# Patient Record
Sex: FEMALE | Race: OTHER | Employment: UNEMPLOYED | URBAN - METROPOLITAN AREA
[De-identification: names, ages, dates, MRNs, and addresses within clinical notes are randomized per-mention and may not be internally consistent; named-entity substitution may affect disease eponyms.]

---

## 2019-04-23 ENCOUNTER — HOSPITAL ENCOUNTER (OUTPATIENT)
Dept: PHYSICAL THERAPY | Age: 34
Discharge: HOME | End: 2019-04-23
Payer: SELF-PAY

## 2019-04-23 PROCEDURE — 97110 THERAPEUTIC EXERCISES: CPT

## 2019-04-23 PROCEDURE — 97162 PT EVAL MOD COMPLEX 30 MIN: CPT

## 2019-04-23 NOTE — PROGRESS NOTES
[facility] 
[address] [facility]  [facility], [address] [facility] Ilana Ph [phone]  Fx [phone] Plan of Care/ Statement of Necessity for Physical Therapy Services Patient name: Aida Cardoza Start of Care: 2019 Referral source: Thompson Dewitt MD : 1985 Medical Diagnosis: right knee pain Onset Date:2018 Treatment Diagnosis: Pain in right knee [M25.561] Prior Hospitalization: see medical history Provider#: [id number] Medications: Verified on Patient summary List  
 Comorbidities: diabetes Prior Level of Function: functionally independent, Danish speaking The Plan of Care and following information is based on the information from the initial evaluation. Assessment/ key information: 37 yo female who presents to [facility] with c/o right knee pain. Patient reports her pain started last December with no specific BENNIE. Patient presents with right knee ROM WFL, however she has decreased strength, impaired gait and balance on right LE. Patient reports she uses Meloxicam and ice to help decrease the pain which stays between 5/10 and 10/10. Patient reports her right knee pain is better with rest and worst with ambulation. She is currently not working because of knee pain. Patient would benefit from skilled PT intervention to address above deficits. Patient will continue to benefit from skilled PT services to modify and progress therapeutic interventions, address functional mobility deficits, address strength deficits, analyze and address soft tissue restrictions, analyze and cue movement patterns, analyze and modify body mechanics/ergonomics and assess and modify postural abnormalities to attain remaining goals.  
 
 
Evaluation Complexity History MEDIUM  Complexity : 1-2 comorbidities / personal factors will impact the outcome/ POC ; Examination MEDIUM Complexity : 3 Standardized tests and measures addressing body structure, function, activity limitation and / or participation in recreation  ;Presentation MEDIUM Complexity : Evolving with changing characteristics  ; Clinical Decision Making MEDIUM Complexity : FOTO score of 26-74 Overall Complexity Rating: MEDIUM Problem List: pain affecting function, decrease strength, edema affecting function, impaired gait/ balance, decrease ADL/ functional abilitiies and decrease activity tolerance Treatment Plan may include any combination of the following: Therapeutic exercise, Therapeutic activities, Neuromuscular re-education, Physical agent/modality, Gait/balance training, Manual therapy, Patient education, Functional mobility training and Stair training Patient / Family readiness to learn indicated by: asking questions, trying to perform skills and interest 
Persons(s) to be included in education: patient (P) Barriers to Learning/Limitations: none Measures taken if barriers to learning: NA 
Patient Goal (s): to make my knee feel better Patient Self Reported Health Status: fair Rehabilitation Potential: good Short Term Goals: To be accomplished in 3 weeks: 1. Patient will be compliant with HEP to progress toward goals and restore functional mobility. Eval Status: Issued at [email or web address] 
2. Patient will improve FOTO score by 13 points to improve functional tolerance for exercise. Eval Status: FOTO 32 
  
3. Patient will improve pain in right knee to 5/10 at worst to improve ambulation tolerance and restore prior level of function. Eval Status: 10/10 at worst 
  
Long Term Goals: To be accomplished in 6 weeks: 1. Patient will be independent with HEP to progress toward goals of community ambulation without pain. Eval Status: issued at [address] 
2. Patient will improve FOTO score by 26 points to improve functional tolerance for community ambulation. Eval Status: FOTO 32 
  
3. Patient will have 5/5 right LE strength to return to goals of returning to work. Eval Status: Hip L (1-5) R (1-5) Hip Flexion 5/5 4/5* Hip ABD 5/5 4/5 Hip ADD 5/5 4-/5 Hip ER 4+/5 3+/5 Hip IR 4+/5 3+/5  
  
Knee L (1-5) R (1-5) Knee Flexion 5/5 4/5* Knee Extension 5/5 4/5* Ankle PF 5/5 5/5 Ankle DF 5/5 5/5 Other      
  
  
4. Patient will improve pain in right knee to 2/10 to improve ambulation tolerance and restore prior level of function. Eval Status: 10/10 at worst 
 
 
 
Frequency / Duration: Patient to be seen 2 times per week for 6 weeks. Patient/ Caregiver education and instruction: Diagnosis, prognosis, activity modification and exercises 
 [x]  Plan of care has been reviewed with PTA Certification Period: April 23, 2019-July 22, 2019 Andie Fernando, PT 4/23/2019 3:28 PM 
 
________________________________________________________________________ I certify that the above Therapy Services are being furnished while the patient is under my care. I agree with the treatment plan and certify that this therapy is necessary. [de-identified] Signature:_____________________Date:____________TIME:________ 
 
[employer], Date and Time must be completed for valid certification ** Please sign and return to [facility] 
[address], [address] [facility] Ph [phone]  Fx [phone]

## 2019-04-23 NOTE — PROGRESS NOTES
PT DAILY TREATMENT NOTE/Hip/Knee/Ankle EVAL 10-18 Patient Name: [facility] Date:2019 : 1985 [x]  Patient  Verified Payor: SELF PAY / Plan: [facility] SELF PAY / Product Type: Self Pay / In time:1350  Out time:1445 Total Treatment Time (min): 55 Visit #: 1 of 8 Medicare/BCBS Only Total Timed Codes (min):  NA 1:1 Treatment Time:  NA Treatment Area: Pain in right knee [M25.561] SUBJECTIVE Pain Level (0-10 scale): 9/10 deep pain 
[x]constant []intermittent []improving []worsening []no change since onset Any medication changes, allergies to medications, adverse drug reactions, diagnosis change, or new procedure performed?: [x] No    [] Yes (see summary sheet for update) Subjective functional status/changes:    
PLOF: functionally independent without AD, Honduran speaking Limitations to PLOF: pain in right knee, decreased ambulation tolerance Mechanism of Injury: right knee pain that started in , no specific BENNIE Current symptoms/Complaints: night pain, worse with walking, 9/10 today, 10/10 at worst, 5/10 best 
Previous Treatment/Compliance: meloxicam, ice, no previous therapy PMHx/Surgical Hx: N/A Work Hx: out of work currently due to knee pain, normally cleans for work Living Situation: one level home, with significant other and baby Pt Goals: \"to make my knee feel better. \" 
Barriers: [x]pain []financial []time []transportation []other Motivation: good Substance use: []Alcohol []Tobacco []other:  
Cognition: A & O x 3    Other: OBJECTIVE Modalities Rationale:     decrease inflammation and decrease pain to improve patient's ability to ambulate community distances 
 min [] Estim, type/location:    
                                 []  att     []  unatt     []  w/US     []  w/ice    []  w/heat  
 min []  Mechanical Traction: type/lbs   
               []  pro   []  sup   []  int   []  cont    []  before manual    []  after manual  
 min []  Ultrasound, settings/location:    
 min []  Iontophoresis w/ dexamethasone, location:   
                                           []  take home patch       []  in clinic  
10 min [x]  Ice     []  Heat    location/position: Supine, right knee  
 min []  Vasopneumatic Device, press/temp:   
 min []  Other:   
[x] Skin assessment post-treatment (if applicable):   
[x]  intact    [x]  redness- no adverse reaction    
[]redness  adverse reaction:     
 
15 min [x]Eval                  []Re-Eval  
 
 
25 min Therapeutic Exercise:  [x] See flow sheet :  
Rationale: increase strength, improve balance and increase proprioception to improve the patients ability to ambulate community distances and restore PLOF. With 
 [x] TE 
 [] TA 
 [] neuro 
 [] other: Patient Education: [x] Review HEP [] Progressed/Changed HEP based on:  
[] positioning   [] body mechanics   [] transfers   [] heat/ice application   
[] other:   
 
 
General Evaluation Gait: antalgic gait on right LE, keeps right knee locked in extension Palpation/Sensation: pain with palpation over medial joint line and inferior/lateral to patella ROM:                              
 
       AROM Knee Left Right Ext WFL 0 Flex  Strength (MMT): 
                                         
Hip L (1-5) R (1-5) Hip Flexion 5/5 4/5* Hip ABD 5/5 4/5 Hip ADD 5/5 4-/5 Hip ER 4+/5 3+/5 Hip IR 4+/5 3+/5 Knee L (1-5) R (1-5) Knee Flexion 5/5 4/5* Knee Extension 5/5 4/5* Ankle PF 5/5 5/5 Ankle DF 5/5 5/5 Other Special Tests: 
 
Knee  Left Right Varus Stress Test  pain Valgus Stress Test  pain Johnny's  pain Patellar Grind  Slight pain Hip Left Right Darrel Test  + Obers  + Pain Level (0-10 scale) post treatment: 7/10 ASSESSMENT/Changes in Function: 35 yo female who presents to [facility] with c/o right knee pain.  Patient reports her pain started last December with no specific BENNIE. Patient presents with right knee ROM WFL, however she has decreased strength, impaired gait and balance on right LE. Patient reports she uses Meloxicam and ice to help decrease the pain which stays between 5/10 and 10/10. Patient reports her right knee pain is better with rest and worst with ambulation. She is currently not working because of knee pain. Patient would benefit from skilled PT intervention to address above deficits. Patient will continue to benefit from skilled PT services to modify and progress therapeutic interventions, address functional mobility deficits, address strength deficits, analyze and address soft tissue restrictions, analyze and cue movement patterns, analyze and modify body mechanics/ergonomics and assess and modify postural abnormalities to attain remaining goals. [x]  See Plan of Care 
[]  See progress note/recertification 
[]  See Discharge Summary Progress towards goals / Updated goals: 
 
Short Term Goals: To be accomplished in 3 weeks: 1. Patient will be compliant with HEP to progress toward goals and restore functional mobility. Eval Status: Issued at [employer] 
 
2. Patient will improve FOTO score by 13 points to improve functional tolerance for exercise. Eval Status: FOTO 32 
 
3. Patient will improve pain in right knee to 5/10 at worst to improve ambulation tolerance and restore prior level of function. Eval Status: 10/10 at worst 
 
Long Term Goals: To be accomplished in 6 weeks: 1. Patient will be independent with HEP to progress toward goals of community ambulation without pain. Eval Status: issued at [employer] 
 
2. Patient will improve FOTO score by 26 points to improve functional tolerance for community ambulation. Eval Status: FOTO 32 
 
3. Patient will have 5/5 right LE strength to return to goals of returning to work. Eval Status:  
Hip L (1-5) R (1-5) Hip Flexion 5/5 4/5* Hip ABD 5/5 4/5 Hip ADD 5/5 4-/5 Hip ER 4+/5 3+/5 Hip IR 4+/5 3+/5 Knee L (1-5) R (1-5) Knee Flexion 5/5 4/5* Knee Extension 5/5 4/5* Ankle PF 5/5 5/5 Ankle DF 5/5 5/5 Other 4. Patient will improve pain in right knee to 2/10 to improve ambulation tolerance and restore prior level of function. Eval Status: 10/10 at worst 
 
 
PLAN [x]  Upgrade activities as tolerated     [x]  Continue plan of care 
[]  Update interventions per flow sheet      
[]  Discharge due to:_ 
[]  Other:_   
 
Amalia Elliott, PT 4/23/2019  1:35 PM

## 2019-04-24 ENCOUNTER — HOSPITAL ENCOUNTER (OUTPATIENT)
Dept: PHYSICAL THERAPY | Age: 34
Discharge: HOME | End: 2019-04-24
Payer: SELF-PAY

## 2019-04-24 PROCEDURE — 97140 MANUAL THERAPY 1/> REGIONS: CPT

## 2019-04-24 PROCEDURE — 97110 THERAPEUTIC EXERCISES: CPT

## 2019-04-24 NOTE — PROGRESS NOTES
PT DAILY TREATMENT NOTE Patient Name: [facility] Date:2019 : 1985 [x]  Patient  Verified Payor: SELF PAY / Plan: [facility] SELF PAY / Product Type: Self Pay / In time:905  Out time:954 Total Treatment Time (min): 49 Total Timed Codes (min): 49 
1:1 Treatment Time (MC/BCBS only): n/a Visit #: 2 of 12 Treatment Area: Pain in right knee [M25.561] SUBJECTIVE Pain Level (0-10 scale): 8 Any medication changes, allergies to medications, adverse drug reactions, diagnosis change, or new procedure performed?: [x] No    [] Yes (see summary sheet for update) Subjective functional status/changes:   [] No changes reported Reports compliance with HEP, most pain with WB during ambulation OBJECTIVEModalities Rationale:     Pain control 
 min [] Estim, type/location:   
                                 []  att     []  unatt     []  w/US     []  w/ice    []  w/heat 
 min []  Mechanical Traction: type/lbs   
               []  pro   []  sup   []  int   []  cont    []  before manual    []  after manual  
 min []  Ultrasound, settings/location:    
 min []  Iontophoresis w/ dexamethasone, location:   
                                           []  take home patch       []  in clinic  
 min []  Ice     []  Heat    location/position:   
 min []  Vasopneumatic Device, press/temp:   
 min []  Other:   
[] Skin assessment post-treatment (if applicable):   
[]  intact    []  redness- no adverse reaction    
[]redness  adverse reaction:     
 
 
 
39 min Therapeutic Exercise:  [x] See flow sheet :  
Rationale: increase strength and increase proprioception to improve the patients ability to return to PLOF 10 min Manual Therapy:  CFM to ember/lat joint line right knee, functional massage and application of Kinesiotape for increased stability Rationale: decrease pain With 
 [] TE 
 [] TA 
 [] neuro 
 [] other: Patient Education: [x] Review HEP   
 [] Progressed/Changed HEP based on:  
[] positioning   [] body mechanics   [] transfers   [] heat/ice application   
[] other:   
 
Other Objective/Functional Measures:  
pain with passive tibial IR 
TTP right ember/lateral joint line/tibial plateau Reports minimal pain with TE but rates pain as 4/10 No antalgic movement signs Symptoms consistent with possible lateral meniscal pathology Pain Level (0-10 scale) post treatment: 4 
 
ASSESSMENT/Changes in Function: some hesitation with TE, using  to improve form with TE 
 
Patient will continue to benefit from skilled PT services to address strength deficits, analyze and address soft tissue restrictions, analyze and cue movement patterns, analyze and modify body mechanics/ergonomics and assess and modify postural abnormalities  
 to attain remaining goals. [x]  See Plan of Care 
[]  See progress note/recertification 
[]  See Discharge Summary Progress towards goals / Updated goals: 
 
Short Term Goals: To be accomplished in 3 weeks: 1. Patient will be compliant with HEP to progress toward goals and restore functional mobility. Eval Status: Issued at [facility] 
Current status: compliance 
  
2. Patient will improve FOTO score by 13 points to improve functional tolerance for exercise. Eval Status: FOTO 32 
  
3. Patient will improve pain in right knee to 5/10 at worst to improve ambulation tolerance and restore prior level of function. Eval Status: 10/10 at worst 
  
Long Term Goals: To be accomplished in 6 weeks: 1. Patient will be independent with HEP to progress toward goals of community ambulation without pain. Eval Status: issued at [facility] 
  
2. Patient will improve FOTO score by 26 points to improve functional tolerance for community ambulation. Eval Status: FOTO 32 
  
3. Patient will have 5/5 right LE strength to return to goals of returning to work. Eval Status:  
Hip L (1-5) R (1-5) Hip Flexion 5/5 4/5* Hip ABD 5/5 4/5  
 Hip ADD 5/5 4-/5 Hip ER 4+/5 3+/5 Hip IR 4+/5 3+/5  
  
Knee L (1-5) R (1-5) Knee Flexion 5/5 4/5* Knee Extension 5/5 4/5* Ankle PF 5/5 5/5 Ankle DF 5/5 5/5 Other      
  
  
4. Patient will improve pain in right knee to 2/10 to improve ambulation tolerance and restore prior level of function. Eval Status: 10/10 at worst 
  
 
 
 
 
PLAN 
[]  Upgrade activities as tolerated     [x]  Continue plan of care 
[]  Update interventions per flow sheet      
[]  Discharge due to:_ 
[]  Other:_ Felicitas Tidwell PT 4/24/2019  9:13 AM 
 
Future Appointments Date Time Provider Mary Meredith 5/6/2019  2:00 PM Noemi Cost, [facility]  
5/8/2019  8:30 AM Jasvir Cuello [facility]  
5/13/2019  1:30 PM Noemi Cost, [facility]  
5/15/2019  1:00 PM Noemi Cost, [facility]  
5/20/2019  1:00 PM Noemi Cost, [facility]  
5/22/2019  9:30 AM Jasvir Cuello [facility]  
5/28/2019  1:15 PM Noemi Cost, [facility]  
5/29/2019 10:30 AM Noemi Cost, [facility]

## 2019-05-06 ENCOUNTER — HOSPITAL ENCOUNTER (OUTPATIENT)
Dept: PHYSICAL THERAPY | Age: 34
Discharge: HOME | End: 2019-05-06
Payer: SELF-PAY

## 2019-05-06 PROCEDURE — 97140 MANUAL THERAPY 1/> REGIONS: CPT

## 2019-05-06 PROCEDURE — 97110 THERAPEUTIC EXERCISES: CPT

## 2019-05-06 NOTE — PROGRESS NOTES
PT DAILY TREATMENT NOTE Patient Name: Burton Simms Date:2019 : 1985 [x]  Patient  Verified Payor: SELF PAY / Plan: [employer] SELF PAY / Product Type: Self Pay / In time:200  Out time:253 Total Treatment Time (min): 53 Total Timed Codes (min): 43 
1:1 Treatment Time (MC only): 43 Visit #: 3 of 12 Treatment Area: Pain in right knee [M25.561] SUBJECTIVE Pain Level (0-10 scale): 6-7/10 Any medication changes, allergies to medications, adverse drug reactions, diagnosis change, or new procedure performed?: [x] No    [] Yes (see summary sheet for update) Subjective functional status/changes:   [] No changes reported Pt reports that she has been compliant with her HEP OBJECTIVE Modalities Rationale:     decrease pain and increase tissue extensibility to improve patient's ability to ambulate community distances 
 min [] Estim, type/location:   
                                 []  att     []  unatt     []  w/US     []  w/ice    []  w/heat 
 min []  Mechanical Traction: type/lbs   
               []  pro   []  sup   []  int   []  cont    []  before manual    []  after manual  
 min []  Ultrasound, settings/location:    
 min []  Iontophoresis w/ dexamethasone, location:   
                                           []  take home patch       []  in clinic  
10 min []  Ice     [x]  Heat    location/position: Hook[place], right knee wrapped around  
 min []  Vasopneumatic Device, press/temp:   
 min []  Other:   
[x] Skin assessment post-treatment (if applicable):   
[x]  intact    [x]  redness- no adverse reaction    
[]redness  adverse reaction:     
 
33 min Therapeutic Exercise:  [] See flow sheet :  
Rationale: increase ROM, increase strength, improve coordination, improve balance and increase proprioception to improve the patients ability to improve community ambulation 15 min Manual Therapy:  DTM to posterior medial popliteal region on right leg, manual hamstring stretch,   
Rationale: decrease pain, increase ROM, increase tissue extensibility, decrease edema  and decrease trigger points to improve the patients ability to improve community ambulation With 
 [] TE 
 [] TA 
 [] neuro 
 [] other: Patient Education: [x] Review HEP [] Progressed/Changed HEP based on:  
[x] positioning   [x] body mechanics   [] transfers   [] heat/ice application   
[] other:   
 
Other Objective/Functional Measures:  
 
Pain Level (0-10 scale) post treatment: 0/10 ASSESSMENT/Changes in Function: Pt demonstrates limited mobility in medial posterior compartment of right knee contributing to bakers cyst and anterior lateral pain. Added 3 way hamstring stretch to promote improved soft tissue mobility. Patient will continue to benefit from skilled PT services to address functional mobility deficits, address ROM deficits, address strength deficits, analyze and address soft tissue restrictions, analyze and cue movement patterns, analyze and modify body mechanics/ergonomics and assess and modify postural abnormalities to attain remaining goals. []  See Plan of Care 
[]  See progress note/recertification 
[]  See Discharge Summary Progress towards goals / Updated goals: 
Short Term Goals: To be accomplished in 3 weeks: 1. Patient will be compliant with HEP to progress toward goals and restore functional mobility. Eval Status: Issued at Eval 
Current status: compliance 
  
2. Patient will improve FOTO score by 13 points to improve functional tolerance for exercise. Eval Status: FOTO 32 
  
3. Patient will improve pain in right knee to 5/10 at worst to improve ambulation tolerance and restore prior level of function. Eval Status: 10/10 at worst 
Current: 0/10 at end of treatment session 5-6-19 
  
Long Term Goals: To be accomplished in 6 weeks: 1. Patient will be independent with HEP to progress toward goals of community ambulation without pain. Eval Status: issued at Eval 
  
2. Patient will improve FOTO score by 26 points to improve functional tolerance for community ambulation. Eval Status: FOTO 32 
  
3. Patient will have 5/5 right LE strength to return to goals of returning to work. Eval Status:  
Hip L (1-5) R (1-5) Hip Flexion 5/5 4/5* Hip ABD 5/5 4/5 Hip ADD 5/5 4-/5 Hip ER 4+/5 3+/5 Hip IR 4+/5 3+/5  
  
Knee L (1-5) R (1-5) Knee Flexion 5/5 4/5* Knee Extension 5/5 4/5* Ankle PF 5/5 5/5 Ankle DF 5/5 5/5 Other      
  
  
4. Patient will improve pain in right knee to 2/10 to improve ambulation tolerance and restore prior level of function. Eval Status: 10/10 at worst 
  
 
 
 
 
PLAN 
[]  Upgrade activities as tolerated     [x]  Continue plan of care 
[]  Update interventions per flow sheet      
[]  Discharge due to:_ 
[]  Other:_ Estefanía Quiles PTA 5/6/2019  2:31 PM 
 
Future Appointments Date Time Provider Mary Meredith 5/8/2019  8:30 AM Rosa Brown PTA [address] [facility]  
5/13/2019  1:30 PM GLENN Greene [address] [facility]  
5/15/2019  1:00 PM GLENN [place] [address] [facility]  
5/20/2019  1:00 PM Georgina Castro [address] [facility]  
5/22/2019  9:30 AM Georgina Robles [address] [facility]  
5/28/2019  1:15 PM GLENN [place] [address] [facility]  
5/29/2019 10:30 AM GLENN Greene [address] [facility]

## 2019-05-08 ENCOUNTER — HOSPITAL ENCOUNTER (OUTPATIENT)
Dept: PHYSICAL THERAPY | Age: 34
Discharge: HOME | End: 2019-05-08
Payer: SELF-PAY

## 2019-05-08 PROCEDURE — 97140 MANUAL THERAPY 1/> REGIONS: CPT

## 2019-05-08 PROCEDURE — 97110 THERAPEUTIC EXERCISES: CPT

## 2019-05-08 NOTE — PROGRESS NOTES
PT DAILY TREATMENT NOTE Patient Name: [facility] Date:2019 : 1985 [x]  Patient  Verified Payor: SELF PAY / Plan: [facility] SELF PAY / Product Type: Self Pay / In time:835  Out time:931 Total Treatment Time (min): 56 Total Timed Codes (min): 46 
1:1 Treatment Time ( [address] only): 46 Visit #: 4 of 12 Treatment Area: Pain in right knee [M25.561] SUBJECTIVE Pain Level (0-10 scale): 610 Any medication changes, allergies to medications, adverse drug reactions, diagnosis change, or new procedure performed?: [x] No    [] Yes (see summary sheet for update) Subjective functional status/changes:   [] No changes reported Pt and  report that she saw her doctor yesterday and he says she should continue with treatment to help improve her knee and that if after this next month she is still having issues he will do an injection if needed. OBJECTIVE Modalities Rationale:     decrease pain and increase tissue extensibility to improve patient's ability to stand more than 30 minutes 
 min [] Estim, type/location:   
                                 []  att     []  unatt     []  w/US     []  w/ice    []  w/heat 
 min []  Mechanical Traction: type/lbs   
               []  pro   []  sup   []  int   []  cont    []  before manual    []  after manual  
 min []  Ultrasound, settings/location:    
 min []  Iontophoresis w/ dexamethasone, location:   
                                           []  take home patch       []  in clinic  
10 min []  Ice     [x]  Heat    location/position: Butterfly stretch supported in supine, right adductor  
 min []  Vasopneumatic Device, press/temp:   
 min []  Other:   
[x] Skin assessment post-treatment (if applicable):   
[x]  intact    [x]  redness- no adverse reaction    
[]redness  adverse reaction:     
 
31 min Therapeutic Exercise:  [] See flow sheet :  
Rationale: increase ROM, increase strength, improve coordination, improve balance and increase proprioception to improve the patients ability to stand more than 30 minutes 15 min Manual Therapy:  DTM to right adductor, skin rolling to right adductor, contract relax to right hamstrings, manual stretching of right adductor, web taping at distal end of medial hamstrings on right Rationale: decrease pain, increase ROM, increase tissue extensibility, decrease edema  and decrease trigger points to improve the patients ability to stand more than 30 minutes With 
 [x] TE 
 [] TA 
 [] neuro 
 [] other: Patient Education: [x] Review HEP [] Progressed/Changed HEP based on:  
[x] positioning   [x] body mechanics   [] transfers   [] heat/ice application   
[] other:   
 
Other Objective/Functional Measures Pain Level (0-10 scale) post treatment: 3/10 ASSESSMENT/Changes in Function: Pt demonstrates very restricted medial hamstring on right contributing to ongoing pain in right knee with ambulation. Added more strength components to promote reduced load into knee. Patient will continue to benefit from skilled PT services to address functional mobility deficits, address ROM deficits, address strength deficits, analyze and address soft tissue restrictions, analyze and cue movement patterns, analyze and modify body mechanics/ergonomics and assess and modify postural abnormalities to attain remaining goals. []  See Plan of Care 
[]  See progress note/recertification 
[]  See Discharge Summary Short Term Goals: To be accomplished in 3 weeks: 1. Patient will be compliant with HEP to progress toward goals and restore functional mobility. Eval Status: Issued at Eval 
Current status: compliance 
  
2. Patient will improve FOTO score by 13 points to improve functional tolerance for exercise. Eval Status: FOTO 32 
  
3. Patient will improve pain in right knee to 5/10 at worst to improve ambulation tolerance and restore prior level of function. Eval Status: 10/10 at worst 
Current: 0/10 at end of treatment session 5-6-19 
  
Long Term Goals: To be accomplished in 6 weeks: 1. Patient will be independent with HEP to progress toward goals of community ambulation without pain. Eval Status: issued at [facility] 
  
2. Patient will improve FOTO score by 26 points to improve functional tolerance for community ambulation. Eval Status: FOTO 32 
  
3. Patient will have 5/5 right LE strength to return to goals of returning to work. Eval Status:  
Hip L (1-5) R (1-5) Hip Flexion 5/5 4/5* Hip ABD 5/5 4/5 Hip ADD 5/5 4-/5 Hip ER 4+/5 3+/5 Hip IR 4+/5 3+/5  
  
Knee L (1-5) R (1-5) Knee Flexion 5/5 4/5* Knee Extension 5/5 4/5* Ankle PF 5/5 5/5 Ankle DF 5/5 5/5 Other      
  
  
4. Patient will improve pain in right knee to 2/10 to improve ambulation tolerance and restore prior level of function. Eval Status: 10/10 at worst 
Current: 6/10 at beginning 
  
  
  
 
 
 
 
PLAN 
[]  Upgrade activities as tolerated     [x]  Continue plan of care 
[]  Update interventions per flow sheet      
[]  Discharge due to:_ 
[]  Other:_ Elver Molina PTA 5/8/2019  8:30 AM 
 
Future Appointments Date Time Provider Mary Meredith 5/13/2019  1:30 PM Enedina Alexander PTA [facility]  
5/15/2019  1:00 PM Enedina Alexander PTA [facility]  
5/20/2019  1:00 PM [facility]  
5/22/2019  9:30 AM [facility]  
5/28/2019  1:15 PM Enedina Alexander PTA [facility]  
5/29/2019 10:30 AM Enedina Alexander PTA [facility]

## 2019-05-13 ENCOUNTER — HOSPITAL ENCOUNTER (OUTPATIENT)
Dept: PHYSICAL THERAPY | Age: 34
Discharge: HOME | End: 2019-05-13
Payer: SELF-PAY

## 2019-05-13 PROCEDURE — 97110 THERAPEUTIC EXERCISES: CPT

## 2019-05-13 PROCEDURE — 97140 MANUAL THERAPY 1/> REGIONS: CPT

## 2019-05-13 NOTE — PROGRESS NOTES
PT DAILY TREATMENT NOTE [facility] : 1985 [x]  Patient  Verified Payor: SELF PAY [facility] / Product Type: Self Pay / In time:206  Out time:247 Total Treatment Time (min): 41 Total Timed Codes (min): 31 
1:1 Treatment Time ([facility] only): 31 Visit #: 5 of 12 Treatment Area: Pain in right knee [M25.561] SUBJECTIVE Pain Level (0-10 scale): 10 Any medication changes, allergies to medications, adverse drug reactions, diagnosis change, or new procedure performed?: [x] No    [] Yes (see summary sheet for update) Subjective functional status/changes:   [] No changes reported Pt reports that she thinks that the tape helped her OBJECTIVE Modalities Rationale:     decrease pain and increase tissue extensibility to improve patient's ability to ambulate community distance 
 min [] Estim, type/location:   
                                 []  att     []  unatt     []  w/US     []  w/ice    []  w/heat 
 min []  Mechanical Traction: type/lbs   
               []  pro   []  sup   []  int   []  cont    []  before manual    []  after manual  
 min []  Ultrasound, settings/location:    
 min []  Iontophoresis w/ dexamethasone, location:   
                                           []  take home patch       []  in clinic  
10 min []  Ice     [x]  Heat    location/position: butterfyl heat on right adductor  
 min []  Vasopneumatic Device, press/temp:   
 min []  Other:   
[x] Skin assessment post-treatment (if applicable):   
[x]  intact    [x]  redness- no adverse reaction    
[]redness  adverse reaction:     
 
21 min Therapeutic Exercise:  [] See flow sheet :  
Rationale: increase ROM, increase strength, improve coordination and increase proprioception to improve the patients ability to ambulate community distance 10 min Manual Therapy:  DTM to medial hamstring on right knee, DTM to right adductors myofascial leg pull, manual adductor stretch, kinesiotape applied with webbing approach over medial knee, Rationale: decrease pain, increase ROM, increase tissue extensibility, decrease edema  and decrease trigger points to improve the patients ability to ambulate community distances With 
 [x] TE 
 [] TA 
 [] neuro 
 [] other: Patient Education: [x] Review HEP [] Progressed/Changed HEP based on:  
[x] positioning   [] body mechanics   [] transfers   [] heat/ice application   
[] other:   
 
Other Objective/Functional Measures:    
 
Pain Level (0-10 scale) post treatment: 0/10 ASSESSMENT/Changes in Function: Pt demonstrates improved softening in adductor and medial knee however continues to have limited mobility through medial hamstring. She will benefit from continued manual work as well as strenghtening Patient will continue to benefit from skilled PT services to address functional mobility deficits, address ROM deficits, address strength deficits, analyze and address soft tissue restrictions, analyze and cue movement patterns, analyze and modify body mechanics/ergonomics and assess and modify postural abnormalities to attain remaining goals. []  See Plan of Care 
[]  See progress note/recertification 
[]  See Discharge Summary Progress towards goals / Updated goals: 
Short Term Goals: To be accomplished in 3 weeks: 1. Patient will be compliant with HEP to progress toward goals and restore functional mobility. Eval Status: Issued at Eval 
Current status: compliance 
  
2. Patient will improve FOTO score by 13 points to improve functional tolerance for exercise. Eval Status: FOTO 32 
  
3. Patient will improve pain in right knee to 5/10 at worst to improve ambulation tolerance and restore prior level of function. Eval Status: 10/10 at worst 
Current: 0/10 at end of treatment session 5-6-19 
  
Long Term Goals: To be accomplished in 6 weeks: 1. Patient will be independent with HEP to progress toward goals of community ambulation without pain. Eval Status: issued at Eval 
  
2. Patient will improve FOTO score by 26 points to improve functional tolerance for community ambulation. Eval Status: FOTO 32 
  
3. Patient will have 5/5 right LE strength to return to goals of returning to work. Eval Status:  
Hip L (1-5) R (1-5) Hip Flexion 5/5 4/5* Hip ABD 5/5 4/5 Hip ADD 5/5 4-/5 Hip ER 4+/5 3+/5 Hip IR 4+/5 3+/5  
  
Knee L (1-5) R (1-5) Knee Flexion 5/5 4/5* Knee Extension 5/5 4/5* Ankle PF 5/5 5/5 Ankle DF 5/5 5/5 Other      
  
  
4. Patient will improve pain in right knee to 2/10 to improve ambulation tolerance and restore prior level of function. Eval Status: 10/10 at worst 
Current: 6/10 at beginning 
  
  
 
 
 
 
PLAN 
[]  Upgrade activities as tolerated     []  Continue plan of care 
[]  Update interventions per flow sheet      
[]  Discharge due to:_ 
[]  Other:_ Saurav Stephens PTA 5/13/2019  2:10 PM 
 
Future Appointments Date Time Provider Mary Meredith 5/15/2019  1:00 PM Loraine Byers PTA [facility]  
5/20/2019  1:00 PM [facility]  
5/22/2019  9:30 AM [facility]  
5/28/2019  1:15 PM Loraine Byers PTA [facility]  
5/29/2019 10:30 AM Loraine Byers PTA [facility]

## 2019-05-15 ENCOUNTER — HOSPITAL ENCOUNTER (OUTPATIENT)
Dept: PHYSICAL THERAPY | Age: 34
Discharge: HOME | End: 2019-05-15
Payer: SELF-PAY

## 2019-05-15 PROCEDURE — 97110 THERAPEUTIC EXERCISES: CPT

## 2019-05-15 PROCEDURE — 97140 MANUAL THERAPY 1/> REGIONS: CPT

## 2019-05-15 NOTE — PROGRESS NOTES
PT DAILY TREATMENT NOTE [facility] Date:5/15/2019 : 1985 [x]  Patient  Verified Payor: SELF PAY / Plan: [facility] SELF PAY / Product Type: Self Pay / In time:1245  Out time:145 Total Treatment Time (min): 60 Total Timed Codes (min): 50 
1:1 Treatment Time ([facility] only): 50 Visit #: 6 of 12 Treatment Area: Pain in right knee [M25.561] SUBJECTIVE Pain Level (0-10 scale): 3/10 Any medication changes, allergies to medications, adverse drug reactions, diagnosis change, or new procedure performed?: [x] No    [] Yes (see summary sheet for update) Subjective functional status/changes:   [] No changes reported Pt reports that she is feeling better. OBJECTIVE Modalities Rationale:     decrease pain and increase tissue extensibility to improve patient's ability to ambulate community distances 
 min [] Estim, type/location:   
                                 []  att     []  unatt     []  w/US     []  w/ice    []  w/heat 
 min []  Mechanical Traction: type/lbs   
               []  pro   []  sup   []  int   []  cont    []  before manual    []  after manual  
 min []  Ultrasound, settings/location:    
 min []  Iontophoresis w/ dexamethasone, location:   
                                           []  take home patch       []  in clinic With treatment min []  Ice     [x]  Heat    location/position: Supine in cobbler pose, right knee  
 min []  Vasopneumatic Device, press/temp:   
 min []  Other:   
[x] Skin assessment post-treatment (if applicable):   
[x]  intact    [x]  redness- no adverse reaction    
[]redness  adverse reaction:     
 
35 min Therapeutic Exercise:  [] See flow sheet :  
Rationale: increase ROM, increase strength, improve coordination, improve balance and increase proprioception to improve the patients ability to ambulate community distances 15 min Manual Therapy:  myofsacial leg pull on right, DTM to right adductors, manual adductor stretch, contract relax on hamstrings on right, web approach for kinesiotape applied to medial right knee Rationale: decrease pain, increase ROM, increase tissue extensibility, decrease edema  and decrease trigger points to improve the patients ability to ambulate community distances With 
 [x] TE 
 [] TA 
 [] neuro 
 [] other: Patient Education: [x] Review HEP [] Progressed/Changed HEP based on:  
[x] positioning   [x] body mechanics   [] transfers   [x] heat/ice application   
[] other:   
 
Other Objective/Functional Measures:    
 
Pain Level (0-10 scale) post treatment: 2/10 ASSESSMENT/Changes in Function: Pt demonstrates improved softening of medial knee and hamstring/adductor contributing to reduced pain. Pt will benefit from addition of strengthening in functional positions at next visit. Patient will continue to benefit from skilled PT services to address functional mobility deficits, address ROM deficits, address strength deficits, analyze and address soft tissue restrictions, analyze and cue movement patterns, analyze and modify body mechanics/ergonomics and assess and modify postural abnormalities to attain remaining goals. []  See Plan of Care 
[]  See progress note/recertification 
[]  See Discharge Summary Progress towards goals / Updated goals: 
Short Term Goals: To be accomplished in 3 weeks: 1. Patient will be compliant with HEP to progress toward goals and restore functional mobility. Eval Status: Issued at Eval 
Current status: compliance 
  
2. Patient will improve FOTO score by 13 points to improve functional tolerance for exercise. Eval Status: FOTO 32 
Current: 38 IMPROVING 
  
3. Patient will improve pain in right knee to 5/10 at worst to improve ambulation tolerance and restore prior level of function. Eval Status: 10/10 at worst 
Current: 2-3/10 MET 
  
Long Term Goals: To be accomplished in 6 weeks: 1. Patient will be independent with HEP to progress toward goals of community ambulation without pain. Eval Status: issued at Eval 
  
2. Patient will improve FOTO score by 26 points to improve functional tolerance for community ambulation. Eval Status: FOTO 32 
Current: 38 IMPROVING 
  
3. Patient will have 5/5 right LE strength to return to goals of returning to work. Eval Status:  
Hip L (1-5) R (1-5) Hip Flexion 5/5 4/5* Hip ABD 5/5 4/5 Hip ADD 5/5 4-/5 Hip ER 4+/5 3+/5 Hip IR 4+/5 3+/5  
  
Knee L (1-5) R (1-5) Knee Flexion 5/5 4/5* Knee Extension 5/5 4/5* Ankle PF 5/5 5/5 Ankle DF 5/5 5/5 Other      
  
  
4. Patient will improve pain in right knee to 2/10 to improve ambulation tolerance and restore prior level of function. Eval Status: 10/10 at worst 
Current: 6/10 at beginning 
  
  
  
  
  
 
 
 
 
PLAN 
[]  Upgrade activities as tolerated     []  Continue plan of care 
[]  Update interventions per flow sheet      
[]  Discharge due to:_ 
[]  Other:_ Fitz Dozier PTA 5/15/2019  11:54 AM 
 
Future Appointments Date Time Provider Mary Meredith 5/15/2019  1:00 PM Harvinder Becerra PTA [facility]  
5/20/2019  1:00 PM [facility]  
5/22/2019  9:30 AM [facility]  
5/28/2019  1:30 PM Jessica Carranza PT [facility]  
5/29/2019 10:30 AM Harvinder Becerra PTA [facility]

## 2019-05-20 ENCOUNTER — HOSPITAL ENCOUNTER (OUTPATIENT)
Dept: PHYSICAL THERAPY | Age: 34
Discharge: HOME | End: 2019-05-20
Payer: SELF-PAY

## 2019-05-20 PROCEDURE — 97110 THERAPEUTIC EXERCISES: CPT

## 2019-05-20 NOTE — PROGRESS NOTES
PT DAILY TREATMENT NOTE Patient Name: [facility] Date:2019 : 1985 [x]  Patient  Verified Payor: SELF PAY / [facility] / Product Type: Self Pay / In time:1250  Out time:1245 Total Treatment Time (min): 55 Total Timed Codes (min): NA 
1:1 Treatment Time ( [address] only): NA Visit #: 7 of 12 Treatment Area: Pain in right knee [M25.561] SUBJECTIVE Pain Level (0-10 scale): 4/10 Any medication changes, allergies to medications, adverse drug reactions, diagnosis change, or new procedure performed?: [x] No    [] Yes (see summary sheet for update) Subjective functional status/changes:   [] No changes reported Patient pleasant, reports no new complaints. OBJECTIVE Modalities Rationale:     decrease pain and increase tissue extensibility to improve patient's ability to restore PLOF 
 min - Estim, type/location:   
                                 -  att     -  unatt     -  w/US     -  w/ice    -  w/heat 
 min -  Mechanical Traction: type/lbs -  pro   -  sup   -  int   -  cont    -  before manual    -  after manual  
 min -  Ultrasound, settings/location:    
 min -  Iontophoresis w/ dexamethasone, location:   
                                           -  take home patch       -  in clinic  
10 min -  Ice    X Heat    location/position: Supine, right hip flexor  
 min -  Vasopneumatic Device, press/temp:   
 min -  Other:   
x Skin assessment post-treatment (if applicable):   
x  intact    x redness- no adverse reaction    
-redness  adverse reaction:     
 
45 min Therapeutic Exercise:  [x] See flow sheet :  
Rationale: increase ROM and increase strength to improve the patients ability to restore PLOF With 
 [x] TE 
 [] TA 
 [] neuro 
 [] other: Patient Education: [x] Review HEP [] Progressed/Changed HEP based on:  
[] positioning   [] body mechanics   [] transfers   [] heat/ice application   
[] other: Pain Level (0-10 scale) post treatment: 2/10 ASSESSMENT/Changes in Function: Patient tolerated treatment session well today. Improved pain following session today. No complaints with therex. Patient will continue to benefit from skilled PT services to modify and progress therapeutic interventions, address functional mobility deficits, address ROM deficits, address strength deficits, analyze and address soft tissue restrictions, analyze and cue movement patterns, analyze and modify body mechanics/ergonomics and assess and modify postural abnormalities to attain remaining goals. [x]  See Plan of Care 
[]  See progress note/recertification 
[]  See Discharge Summary Progress towards goals / Updated goals: 
Short Term Goals: To be accomplished in 3 weeks: 1. Patient will be compliant with HEP to progress toward goals and restore functional mobility. Eval Status: Issued at Eval 
Current status: compliance 
  
2. Patient will improve FOTO score by 13 points to improve functional tolerance for exercise. Eval Status: FOTO 32 
Current: 38 IMPROVING 
  
3. Patient will improve pain in right knee to 5/10 at worst to improve ambulation tolerance and restore prior level of function. Eval Status: 10/10 at worst 
Current: 2-3/10 MET 
  
Long Term Goals: To be accomplished in 6 weeks: 1. Patient will be independent with HEP to progress toward goals of community ambulation without pain. Eval Status: issued at Eval 
  
2. Patient will improve FOTO score by 26 points to improve functional tolerance for community ambulation. Eval Status: FOTO 32 
Current: 38 IMPROVING 
  
3. Patient will have 5/5 right LE strength to return to goals of returning to work. Eval Status:  
Hip L (1-5) R (1-5) Hip Flexion 5/5 4/5* Hip ABD 5/5 4/5 Hip ADD 5/5 4-/5 Hip ER 4+/5 3+/5 Hip IR 4+/5 3+/5  
  
Knee L (1-5) R (1-5) Knee Flexion 5/5 4/5* Knee Extension 5/5 4/5* Ankle PF 5/5 5/5 Ankle DF 5/5 5/5  
 Other      
  
  
4. Patient will improve pain in right knee to 2/10 to improve ambulation tolerance and restore prior level of function. Eval Status: 10/10 at worst 
Current: 6/10 at beginning 
  
 
 
 
 
PLAN [x]  Upgrade activities as tolerated     [x]  Continue plan of care 
[]  Update interventions per flow sheet      
[]  Discharge due to:_ 
[]  Other:_   
 
Gulshan Porras, PT 5/20/2019  1:32 PM 
 
Future Appointments Date Time Provider Mary Meredith 5/22/2019  9:30 AM Tiffany Beach [facility]  
5/28/2019  1:30 PM Rupinder Mason, PT [facility]  
5/29/2019 10:30 AM Diamante Currie, [facility]

## 2019-05-22 ENCOUNTER — HOSPITAL ENCOUNTER (OUTPATIENT)
Dept: PHYSICAL THERAPY | Age: 34
Discharge: HOME | End: 2019-05-22
Payer: SELF-PAY

## 2019-05-22 PROCEDURE — 97110 THERAPEUTIC EXERCISES: CPT

## 2019-05-22 NOTE — PROGRESS NOTES
PT DAILY TREATMENT NOTE    Patient Name: Norma Antonio  Date:2019  : 1985  [x]  Patient  Verified  Payor: SELF PAY / Plan: [facility] SELF PAY / Product Type: Self Pay /    In time:9:35  Out time:10:25  Total Treatment Time (min): 50  Total Timed Codes (min): 40  1:1 Treatment Time (MC only): NA   Visit #: 8 of 12    Treatment Area: Pain in right knee [M25.561]    SUBJECTIVE  Pain Level (0-10 scale): 3/10  Any medication changes, allergies to medications, adverse drug reactions, diagnosis change, or new procedure performed?: [x] No    [] Yes (see summary sheet for update)  Subjective functional status/changes:   [] No changes reported  \"I have some pain in my knee. \"    OBJECTIVE    Modalities Rationale:     decrease edema, decrease inflammation and decrease pain to improve patient's ability to return to prior level of physical activity. 10 min []  Ice     [x]  Heat    location/position: Supine/right knee   [x] Skin assessment post-treatment (if applicable):    [x]  intact    []  redness- no adverse reaction     []redness  adverse reaction:          40 min Therapeutic Exercise:  [x] See flow sheet :   Rationale: increase ROM, increase strength and improve coordination to improve the patients ability to return to prior level of physical activity. With   [] TE   [] TA   [] neuro   [] other: Patient Education: [x] Review HEP    [] Progressed/Changed HEP based on:   [] positioning   [] body mechanics   [] transfers   [] heat/ice application    [] other:      Other Objective/Functional Measures:      Pain Level (0-10 scale) post treatment: 0/10    ASSESSMENT/Changes in Function: Pt tolerated session well. Pt reported moderate challenge with ex but not above tolerance.  Pt received MHP post tx     Patient will continue to benefit from skilled PT services to modify and progress therapeutic interventions, address functional mobility deficits, address ROM deficits, address strength deficits, analyze and address soft tissue restrictions, analyze and cue movement patterns, analyze and modify body mechanics/ergonomics and assess and modify postural abnormalities to attain remaining goals. []  See Plan of Care  []  See progress note/recertification  []  See Discharge Summary         Progress towards goals / Updated goals:  Short Term Goals: To be accomplished in 3 weeks:  1. Patient will be compliant with HEP to progress toward goals and restore functional mobility. Eval Status: Issued at Eval  Current status: compliance     2. Patient will improve FOTO score by 13 points to improve functional tolerance for exercise. Eval Status: FOTO 32  Current: 38 IMPROVING     3. Patient will improve pain in right knee to 5/10 at worst to improve ambulation tolerance and restore prior level of function. Eval Status: 10/10 at worst  Current: 2-3/10 MET     Long Term Goals: To be accomplished in 6 weeks:  1. Patient will be independent with HEP to progress toward goals of community ambulation without pain. Eval Status: issued at Eval     2. Patient will improve FOTO score by 26 points to improve functional tolerance for community ambulation. Eval Status: FOTO 32  Current: 38 IMPROVING     3. Patient will have 5/5 right LE strength to return to goals of returning to work. Eval Status:   Hip L (1-5) R (1-5)   Hip Flexion 5/5 4/5*   Hip ABD 5/5 4/5   Hip ADD 5/5 4-/5   Hip ER 4+/5 3+/5   Hip IR 4+/5 3+/5      Knee L (1-5) R (1-5)   Knee Flexion 5/5 4/5*   Knee Extension 5/5 4/5*   Ankle PF 5/5 5/5   Ankle DF 5/5 5/5   Other             4. Patient will improve pain in right knee to 2/10 to improve ambulation tolerance and restore prior level of function.   Eval Status: 10/10 at worst  Current: 6/10 at worst in previous week 5/22/19                PLAN  [x]  Upgrade activities as tolerated     [x]  Continue plan of care  []  Update interventions per flow sheet       []  Discharge due to:_  []  Other:_      Hosie Oppenheim, PTA 5/22/2019  9:39 AM    Future Appointments   Date Time Provider Mary Meredith   5/28/2019  1:30 PM Jeanna Sigala, PT [facility]   5/29/2019 10:30 AM Zev Leiva PTA [facility]

## 2019-05-28 ENCOUNTER — HOSPITAL ENCOUNTER (OUTPATIENT)
Dept: PHYSICAL THERAPY | Age: 34
Discharge: HOME | End: 2019-05-28
Payer: SELF-PAY

## 2019-05-28 PROCEDURE — 97110 THERAPEUTIC EXERCISES: CPT

## 2019-05-28 NOTE — PROGRESS NOTES
[facility]  [address] [facility] [facility] [address] [place]  Ph [phone]  Fx [phone]    Physical Therapy Progress Note  Patient name: Clemente Perez Start of Care: 19   Referral source: Demario Young MD : 1985   Medical/Treatment Diagnosis: Pain in right knee [M25.561] Onset Date:2018   Prior Hospitalization: see medical history Provider#: [id number]   Medications: Verified on Patient Summary List    Comorbidities: diabetes  Prior Level of Function:functionally independent: [place] speaking    Visits from Start of Care: 9   Missed Visits: 0    Goals/Measure of Progress:    Short Term Goals: To be accomplished in 3 weeks:  1. Patient will be compliant with HEP to progress toward goals and restore functional mobility. Eval Status: Issued at [facility]  Current status: compliance MET     2. Patient will improve FOTO score by 13 points to improve functional tolerance for exercise. Eval Status: FOTO 32  Current: 38 IMPROVING     3. Patient will improve pain in right knee to 5/10 at worst to improve ambulation tolerance and restore prior level of function. Eval Status: 10/10 at worst  Current: 2-3/10 MET     Long Term Goals: To be accomplished in 6 weeks:  1. Patient will be independent with HEP to progress toward goals of community ambulation without pain. Eval Status: issued at Eval  Current: Progressing     2. Patient will improve FOTO score by 26 points to improve functional tolerance for community ambulation. Eval Status: FOTO 32  Current: 38 IMPROVING     3. Patient will have 5/5 right LE strength to return to goals of returning to work. Eval Status:   Hip L (1-5) R (1-5)   Hip Flexion 5/5 4/5*   Hip ABD 5/5 4/5   Hip ADD 5/5 4-/5   Hip ER 4+/5 3+/5   Hip IR 4+/5 3+/5      Knee L (1-5) R (1-5)   Knee Flexion 5/5 4/5*   Knee Extension 5/5 4/5*   Ankle PF 5/5 5/5   Ankle DF 5/5 5/5   Other          Current: right leg 4+/5 throughout PROGRESSING  4.  Patient will improve pain in right knee to 2/10 to improve ambulation tolerance and restore prior level of function. Eval Status: 10/10 at worst  Current: 2/10 today NEARLY MET                 Key Functional Changes: Pt demonstrates increased strength with all activities and much less pain. She continues to have pain and weakness with stair navigation, particularly stair descent and will benefit from continued treatment to address these deficits. Updated Goals:  To be achieved in 4 weeks:  Continue with all goals      ASSESSMENT/RECOMMENDATIONS:  [x]Continue therapy per initial plan/protocol at a frequency of  1-2 x per week for 4 weeks  []Continue therapy with the following recommended changes:   _____________________ _____________________________ _________________________________  []Discontinue therapy progressing towards or have reached established goals  []Discontinue therapy due to lack of appreciable progress towards goals  []Discontinue therapy due to lack of attendance or compliance  []Await Physician's recommendations/decisions regarding therapy  []Other:   _____________________ _____________________________ _________________________________  Thank you for this referral.     LPTA Signature: Sol Liz PTA  Date: 5/28/2019   PT Signature:  Time: 2:10 PM       NOTE TO PHYSICIAN:  [address]   FAX TO [facility]: [phone]  If you are unable to process this request in 24 hours please contact our office: [phone]      ____ I have read the above report and request that my patient continue therapy with the following changes/special instructions:  ____ I have read the above report and request that my patient be discharged from therapy    Physician's Signature:____________Date:_________TIME:________    ** Signature, Date and Time must be completed for valid certification **

## 2019-05-28 NOTE — PROGRESS NOTES
PT DAILY TREATMENT NOTE    Patient Name: Rambo Farah  Date:2019  : 1985  [x]  Patient  Verified  Payor: SELF PAY / Plan: [facility] SELF PAY / Product Type: Self Pay /    In time:100  Out time:155  Total Treatment Time (min): 55  Total Timed Codes (min): 55  1:1 Treatment Time ( only): 54   Visit #: 9 of 12    Treatment Area: Pain in right knee [M25.561]    SUBJECTIVE  Pain Level (0-10 scale): 2/10  Any medication changes, allergies to medications, adverse drug reactions, diagnosis change, or new procedure performed?: [x] No    [] Yes (see summary sheet for update)  Subjective functional status/changes:   [] No changes reported  Pt reports that she thinks she has gotten better but she still has some pain and fear/weakness coming down stairs    OBJECTIVE    55 min Therapeutic Exercise:  [] See flow sheet :added stairs, step down, balance, standing hip strengthening   Rationale: increase ROM, increase strength, improve coordination, improve balance and increase proprioception to improve the patients ability to descend stairs with ease    With   [x] TE   [] TA   [] neuro   [] other: Patient Education: [x] Review HEP    [] Progressed/Changed HEP based on:   [x] positioning   [x] body mechanics   [] transfers   [x] heat/ice application    [] other:      Other Objective/Functional Measures:       Pain Level (0-10 scale) post treatment: 1/10    ASSESSMENT/Changes in Function: Pt demonstrates increasing strength with less pain. She continues to have pain and weakness with descent during stair navigation and will benefit from continued strengthening to promote safety and function.     Patient will continue to benefit from skilled PT services to address functional mobility deficits, address ROM deficits, address strength deficits, analyze and address soft tissue restrictions, analyze and cue movement patterns, analyze and modify body mechanics/ergonomics, assess and modify postural abnormalities, address imbalance/dizziness and instruct in home and community integration to attain remaining goals. []  See Plan of Care  []  See progress note/recertification  []  See Discharge Summary         Progress towards goals / Updated goals:  Short Term Goals: To be accomplished in 3 weeks:  1. Patient will be compliant with HEP to progress toward goals and restore functional mobility. Eval Status: Issued at [facility]  Current status: compliance     2. Patient will improve FOTO score by 13 points to improve functional tolerance for exercise. Eval Status: FOTO 32  Current: 38 IMPROVING     3. Patient will improve pain in right knee to 5/10 at worst to improve ambulation tolerance and restore prior level of function. Eval Status: 10/10 at worst  Current: 2-3/10 MET     Long Term Goals: To be accomplished in 6 weeks:  1. Patient will be independent with HEP to progress toward goals of community ambulation without pain. Eval Status: issued at [facility]     2. Patient will improve FOTO score by 26 points to improve functional tolerance for community ambulation. Eval Status: FOTO 32  Current: 38 IMPROVING     3. Patient will have 5/5 right LE strength to return to goals of returning to work. Eval Status:   Hip L (1-5) R (1-5)   Hip Flexion 5/5 4/5*   Hip ABD 5/5 4/5   Hip ADD 5/5 4-/5   Hip ER 4+/5 3+/5   Hip IR 4+/5 3+/5      Knee L (1-5) R (1-5)   Knee Flexion 5/5 4/5*   Knee Extension 5/5 4/5*   Ankle PF 5/5 5/5   Ankle DF 5/5 5/5   Other          Current: right leg 4+/5 throughout PROGRESSING  4. Patient will improve pain in right knee to 2/10 to improve ambulation tolerance and restore prior level of function.   Eval Status: 10/10 at worst  Current: 2/10 today NEARLY MET                      PLAN  [x]  Upgrade activities as tolerated     [x]  Continue plan of care  []  Update interventions per flow sheet       []  Discharge due to:_  []  Other:_      Merlin Reynolds, PTA 5/28/2019  1:04 PM    Future Appointments   Date Time Provider Mary Meredith   5/28/2019  1:15 PM Kerman Schwab, Arnaldo Brock [facility]   5/29/2019 10:30 AM Kerman Schwab, PTA [facility]

## 2019-05-29 ENCOUNTER — HOSPITAL ENCOUNTER (OUTPATIENT)
Dept: PHYSICAL THERAPY | Age: 34
Discharge: HOME | End: 2019-05-29
Payer: SELF-PAY

## 2019-05-29 PROCEDURE — 97110 THERAPEUTIC EXERCISES: CPT

## 2019-05-29 NOTE — PROGRESS NOTES
PT DAILY TREATMENT NOTE    Patient Name: Aurea Smith  Date:2019  : 1985  [x]  Patient  Verified  Payor: SELF PAY / Plan: [facility]ELF PAY / Product Type: Self Pay /    In time:1025  Out time:1110  Total Treatment Time (min): 45  Total Timed Codes (min): 45  1:1 Treatment Time ( only): 39   Visit #: 10 of 17    Treatment Area: Pain in right knee [M25.561]    SUBJECTIVE  Pain Level (0-10 scale): 2/10  Any medication changes, allergies to medications, adverse drug reactions, diagnosis change, or new procedure performed?: [x] No    [] Yes (see summary sheet for update)  Subjective functional status/changes:   [x] No changes reported       OBJECTIVE        45 min Therapeutic Exercise:  [] See flow sheet :   Rationale: increase ROM, increase strength, improve coordination, improve balance and increase proprioception to improve the patients ability to descend stairs easily    With   [] TE   [] TA   [] neuro   [] other: Patient Education: [x] Review HEP    [x] Progressed/Changed HEP based on:   [x] positioning   [x] body mechanics   [] transfers   [] heat/ice application    [x] other: added functional exercise     Other Objective/Functional Measures:     Pain Level (0-10 scale) post treatment: 0/10    ASSESSMENT/Changes in Function: Pt demonstrates improving strength however continued difficulty with reciprocal descent of stairs. Patient will continue to benefit from skilled PT services to address functional mobility deficits, address ROM deficits, address strength deficits, analyze and address soft tissue restrictions, analyze and cue movement patterns and analyze and modify body mechanics/ergonomics to attain remaining goals. []  See Plan of Care  []  See progress note/recertification  []  See Discharge Summary         Progress towards goals / Updated goals:  Short Term Goals: To be accomplished in 3 weeks:  1.  Patient will be compliant with HEP to progress toward goals and restore functional mobility. Eval Status: Issued at Eval  Current status: compliance MET     2. Patient will improve FOTO score by 13 points to improve functional tolerance for exercise. Eval Status: FOTO 32  Current: 38 IMPROVING     3. Patient will improve pain in right knee to 5/10 at worst to improve ambulation tolerance and restore prior level of function. Eval Status: 10/10 at worst  Current: 2-3/10 MET     Long Term Goals: To be accomplished in 6 weeks:  1. Patient will be independent with HEP to progress toward goals of community ambulation without pain. Eval Status: issued at Eval  Current: Progressing     2. Patient will improve FOTO score by 26 points to improve functional tolerance for community ambulation. Eval Status: FOTO 32  Current: 38 IMPROVING     3. Patient will have 5/5 right LE strength to return to goals of returning to work. Eval Status:   Hip L (1-5) R (1-5)   Hip Flexion 5/5 4/5*   Hip ABD 5/5 4/5   Hip ADD 5/5 4-/5   Hip ER 4+/5 3+/5   Hip IR 4+/5 3+/5      Knee L (1-5) R (1-5)   Knee Flexion 5/5 4/5*   Knee Extension 5/5 4/5*   Ankle PF 5/5 5/5   Ankle DF 5/5 5/5   Other          Current: right leg 4+/5 throughout PROGRESSING  4. Patient will improve pain in right knee to 2/10 to improve ambulation tolerance and restore prior level of function. Eval Status: 10/10 at worst  Current: 2/10 today NEARLY MET             PLAN  [x]  Upgrade activities as tolerated     [x]  Continue plan of care  []  Update interventions per flow sheet       []  Discharge due to:_  []  Other:_      Govind Avila PTA 5/29/2019  11:03 AM    No future appointments.

## 2019-06-03 ENCOUNTER — HOSPITAL ENCOUNTER (OUTPATIENT)
Dept: PHYSICAL THERAPY | Age: 34
Discharge: HOME | End: 2019-06-03
Payer: SELF-PAY

## 2019-06-03 PROCEDURE — 97110 THERAPEUTIC EXERCISES: CPT

## 2019-06-03 NOTE — PROGRESS NOTES
PT DAILY TREATMENT NOTE    Patient Name: Burton Simms  Date:6/3/2019  : 1985  [x]  Patient  Verified  Payor: SELF PAY [facility] / Product Type: Self Pay /    In time:200  Out time:240  Total Treatment Time (min): 40  Total Timed Codes (min): 40  1:1 Treatment Time (MC only): 40   Visit #: 11 of 17    Treatment Area: Pain in right knee [M25.561]    SUBJECTIVE  Pain Level (0-10 scale): 0/10  Any medication changes, allergies to medications, adverse drug reactions, diagnosis change, or new procedure performed?: [x] No    [] Yes (see summary sheet for update)  Subjective functional status/changes:   [x] No changes reported       OBJECTIVE        40 min Therapeutic Exercise:  [] See flow sheet : added core and glute strengthening   Rationale: increase ROM, increase strength, improve coordination, improve balance and increase proprioception to improve the patients ability to ambulate community distance    With   [] TE   [] TA   [] neuro   [] other: Patient Education: [x] Review HEP    [] Progressed/Changed HEP based on:   [] positioning   [] body mechanics   [] transfers   [] heat/ice application    [] other:      Other Objective/Functional Measures:      Pain Level (0-10 scale) post treatment:0/10    ASSESSMENT/Changes in Function:Pt demonstrates improving strength however continues to be limited with quad and glute strength. Patient will continue to benefit from skilled PT services to address functional mobility deficits, address ROM deficits, address strength deficits, analyze and address soft tissue restrictions, analyze and cue movement patterns, analyze and modify body mechanics/ergonomics and assess and modify postural abnormalities to attain remaining goals. []  See Plan of Care  []  See progress note/recertification  []  See Discharge Summary         Progress towards goals / Updated goals:  Short Term Goals: To be accomplished in 3 weeks:  1.  Patient will be compliant with HEP to progress toward goals and restore functional mobility. Eval Status: Issued at Eval  Current status: compliance MET     2. Patient will improve FOTO score by 13 points to improve functional tolerance for exercise. Eval Status: FOTO 32  Current: 38 IMPROVING     3. Patient will improve pain in right knee to 5/10 at worst to improve ambulation tolerance and restore prior level of function. Eval Status: 10/10 at worst  Current: 2-3/10 MET     Long Term Goals: To be accomplished in 6 weeks:  1. Patient will be independent with HEP to progress toward goals of community ambulation without pain. Eval Status: issued at Eval  Current: Progressing     2. Patient will improve FOTO score by 26 points to improve functional tolerance for community ambulation. Eval Status: FOTO 32  Current: 38 IMPROVING     3. Patient will have 5/5 right LE strength to return to goals of returning to work. Eval Status:   Hip L (1-5) R (1-5)   Hip Flexion 5/5 4/5*   Hip ABD 5/5 4/5   Hip ADD 5/5 4-/5   Hip ER 4+/5 3+/5   Hip IR 4+/5 3+/5      Knee L (1-5) R (1-5)   Knee Flexion 5/5 4/5*   Knee Extension 5/5 4/5*   Ankle PF 5/5 5/5   Ankle DF 5/5 5/5   Other          Current: right leg 4+/5 throughout PROGRESSING  4. Patient will improve pain in right knee to 2/10 to improve ambulation tolerance and restore prior level of function.   Eval Status: 10/10 at worst  Current: 2/10 today NEARLY MET                   PLAN  []  Upgrade activities as tolerated     []  Continue plan of care  []  Update interventions per flow sheet       []  Discharge due to:_  []  Other:_      Virgil Wiley PTA 6/3/2019  2:42 PM    Future Appointments   Date Time Provider Mary Meredith   6/5/2019  3:15 PM Evonne Donis [facility]   6/10/2019  3:30 PM Cara Soto [facility]   6/12/2019  2:30 PM Cara Soto [facility]   6/17/2019  2:45 PM Cara Soto [facility]   6/19/2019  2:30 PM Cara Soto, [facility]

## 2019-06-05 ENCOUNTER — HOSPITAL ENCOUNTER (OUTPATIENT)
Dept: PHYSICAL THERAPY | Age: 34
Discharge: HOME | End: 2019-06-05
Payer: SELF-PAY

## 2019-06-05 PROCEDURE — 97110 THERAPEUTIC EXERCISES: CPT

## 2019-06-05 NOTE — PROGRESS NOTES
PT DAILY TREATMENT NOTE    Patient Name: Pamela Dave  Date:2019  : 1985  [x]  Patient  Verified  Payor: SELF PAY / Plan: [facility] SELF PAY / Product Type: Self Pay /    In time:213  Out time:403  Total Treatment Time (min): 50  Total Timed Codes (min): 50  1:1 Treatment Time (MC only): 50   Visit #: 12 of 17    Treatment Area: Pain in right knee [M25.561]    SUBJECTIVE  Pain Level (0-10 scale): 0/10  Any medication changes, allergies to medications, adverse drug reactions, diagnosis change, or new procedure performed?: [x] No    [] Yes (see summary sheet for update)  Subjective functional status/changes:   [x] No changes reported       OBJECTIVE      50 min Therapeutic Exercise:  [] See flow sheet :   Rationale: increase ROM, increase strength, improve coordination, improve balance and increase proprioception to improve the patients ability to navigate stairs    With   [x] TE   [] TA   [] neuro   [] other: Patient Education: [x] Review HEP    [] Progressed/Changed HEP based on:   [x] positioning   [x] body mechanics   [] transfers   [] heat/ice application    [] other:      Other Objective/Functional Measures:     Pain Level (0-10 scale) post treatment:0/10    ASSESSMENT/Changes in Function:Pt demonstrates improving ease with all exercises and is rapidly progressing towards discharge. Discussed dropping her down to once a week. Patient will continue to benefit from skilled PT services to address functional mobility deficits, address ROM deficits, address strength deficits, analyze and address soft tissue restrictions, analyze and cue movement patterns, analyze and modify body mechanics/ergonomics and assess and modify postural abnormalities to attain remaining goals. []  See Plan of Care  []  See progress note/recertification  []  See Discharge Summary         Progress towards goals / Updated goals:  Short Term Goals: To be accomplished in 3 weeks:  1.  Patient will be compliant with HEP to progress toward goals and restore functional mobility. Eval Status: Issued at Eval  Current status: compliance MET     2. Patient will improve FOTO score by 13 points to improve functional tolerance for exercise. Eval Status: FOTO 32  Current: 58 MET 6-5-19     3. Patient will improve pain in right knee to 5/10 at worst to improve ambulation tolerance and restore prior level of function. Eval Status: 10/10 at worst  Current: 2-3/10 MET     Long Term Goals: To be accomplished in 6 weeks:  1. Patient will be independent with HEP to progress toward goals of community ambulation without pain. Eval Status: issued at Eval  Current: Progressing     2. Patient will improve FOTO score by 26 points to improve functional tolerance for community ambulation. Eval Status: FOTO 32  Current: 58 MET 6-5-19     3. Patient will have 5/5 right LE strength to return to goals of returning to work. Eval Status:   Hip L (1-5) R (1-5)   Hip Flexion 5/5 4/5*   Hip ABD 5/5 4/5   Hip ADD 5/5 4-/5   Hip ER 4+/5 3+/5   Hip IR 4+/5 3+/5      Knee L (1-5) R (1-5)   Knee Flexion 5/5 4/5*   Knee Extension 5/5 4/5*   Ankle PF 5/5 5/5   Ankle DF 5/5 5/5   Other          Current: right leg 4+/5 throughout PROGRESSING  4. Patient will improve pain in right knee to 2/10 to improve ambulation tolerance and restore prior level of function.   Eval Status: 10/10 at worst  Current: RESOLVED 6-5-19                         PLAN  []  Upgrade activities as tolerated     []  Continue plan of care  []  Update interventions per flow sheet       []  Discharge due to:_  []  Other:_      Gamal Eaosn PTA 6/5/2019  3:21 PM    Future Appointments   Date Time Provider Mary Meredith   6/10/2019  3:30 PM Mily Connors [facility]   6/12/2019  2:30 PM Tonya Barbosa PTA [facility]   6/17/2019  2:45 PM Tonya Barbosa PTA [facility]   6/19/2019  2:30 PM Tonya Barbosa PTA [facility]

## 2019-06-10 ENCOUNTER — APPOINTMENT (OUTPATIENT)
Dept: PHYSICAL THERAPY | Age: 34
End: 2019-06-10
Payer: SELF-PAY

## 2019-06-12 ENCOUNTER — HOSPITAL ENCOUNTER (OUTPATIENT)
Dept: PHYSICAL THERAPY | Age: 34
Discharge: HOME | End: 2019-06-12
Payer: SELF-PAY

## 2019-06-12 PROCEDURE — 97110 THERAPEUTIC EXERCISES: CPT

## 2019-06-12 NOTE — PROGRESS NOTES
PT DAILY TREATMENT NOTE    Patient Name: Ethan Stovall  Date:2019  : 1985  [x]  Patient  Verified  Payor: SELF PAY / Plan: [facility] SELF PAY / Product Type: Self Pay /    In time:150  Out time:253  Total Treatment Time (min): 63  Total Timed Codes (min): 53  1:1 Treatment Time (MC only): 48   Visit #: 13 of 17    Treatment Area: Pain in right knee [M25.561]    SUBJECTIVE  Pain Level (0-10 scale): 0/10  Any medication changes, allergies to medications, adverse drug reactions, diagnosis change, or new procedure performed?: [x] No    [] Yes (see summary sheet for update)  Subjective functional status/changes:   [] No changes reported  Pt reports that she is feeling good    OBJECTIVE  Modalities: 10 min cold pack on right knee in supine position to help reduce inflammation      53 min Therapeutic Exercise:  [] See flow sheet :   Rationale: increase ROM, increase strength, improve coordination, improve balance and increase proprioception to improve the patients ability to descend stairs with ease          With   [] TE   [] TA   [] neuro   [] other: Patient Education: [x] Review HEP    [] Progressed/Changed HEP based on:   [x] positioning   [x] body mechanics   [] transfers   [] heat/ice application    [] other:      Other Objective/Functional Measures:      Pain Level (0-10 scale) post treatment: 0/10    ASSESSMENT/Changes in Function: Pt demonstrates improving strength overall with improved function and resolved pain. She continues to have some lingering gluteal weakness bilaterally contributing to intermittent challenges however she is progressing rapidly towards goals    Patient will continue to benefit from skilled PT services to address functional mobility deficits, address ROM deficits, address strength deficits, analyze and address soft tissue restrictions, analyze and cue movement patterns and analyze and modify body mechanics/ergonomics to attain remaining goals.      []  See Plan of Care  [] See progress note/recertification  []  See Discharge Summary         Progress towards goals / Updated goals:    Short Term Goals: To be accomplished in 3 weeks:  1. Patient will be compliant with HEP to progress toward goals and restore functional mobility. Eval Status: Issued at [facility]  Current status: compliance MET     2. Patient will improve FOTO score by 13 points to improve functional tolerance for exercise. Eval Status: FOTO 32  Current: 58 MET 6-5-19     3. Patient will improve pain in right knee to 5/10 at worst to improve ambulation tolerance and restore prior level of function. Eval Status: 10/10 at worst  Current: 2-3/10 MET     Long Term Goals: To be accomplished in 6 weeks:  1. Patient will be independent with HEP to progress toward goals of community ambulation without pain. Eval Status: issued at [facility]  Current: Progressing     2. Patient will improve FOTO score by 26 points to improve functional tolerance for community ambulation. Eval Status: FOTO 32  Current: 58 MET 6-5-19     3. Patient will have 5/5 right LE strength to return to goals of returning to work. Eval Status:   Hip L (1-5) R (1-5)   Hip Flexion 5/5 4/5*   Hip ABD 5/5 4/5   Hip ADD 5/5 4-/5   Hip ER 4+/5 3+/5   Hip IR 4+/5 3+/5      Knee L (1-5) R (1-5)   Knee Flexion 5/5 4/5*   Knee Extension 5/5 4/5*   Ankle PF 5/5 5/5   Ankle DF 5/5 5/5   Other          Current: right leg 4+/5 throughout PROGRESSING  4. Patient will improve pain in right knee to 2/10 to improve ambulation tolerance and restore prior level of function.   Eval Status: 10/10 at worst  Current: RESOLVED 6-5-19        PLAN  []  Upgrade activities as tolerated     []  Continue plan of care  []  Update interventions per flow sheet       []  Discharge due to:_  []  Other:_      Danielle Bonilla PTA 6/12/2019  1:58 PM    Future Appointments   Date Time Provider Mary Meredith   6/12/2019  2:30 PM Kimberly Hardy [facility]   6/19/2019  2:30 PM Huan Llamas PTA [facility]

## 2019-06-17 ENCOUNTER — APPOINTMENT (OUTPATIENT)
Dept: PHYSICAL THERAPY | Age: 34
End: 2019-06-17
Payer: SELF-PAY

## 2019-06-19 ENCOUNTER — HOSPITAL ENCOUNTER (OUTPATIENT)
Dept: PHYSICAL THERAPY | Age: 34
Discharge: HOME | End: 2019-06-19
Payer: SELF-PAY

## 2019-06-19 PROCEDURE — 97110 THERAPEUTIC EXERCISES: CPT

## 2019-06-19 NOTE — PROGRESS NOTES
PT DAILY TREATMENT NOTE    Patient Name: Clarissa Marley  Date:2019  : 1985  [x]  Patient  Verified  Payor: SELF PAY / Plan: [facility] SELF PAY / Product Type: Self Pay /    In time:234  Out time:317  Total Treatment Time (min): 43  Total Timed Codes (min): 43  1:1 Treatment Time (MC only): 37   Visit #: 14 of 17    Treatment Area: Pain in right knee [M25.561]    SUBJECTIVE  Pain Level (0-10 scale): 0/10  Any medication changes, allergies to medications, adverse drug reactions, diagnosis change, or new procedure performed?: [x] No    [] Yes (see summary sheet for update)  Subjective functional status/changes:   [] No changes reported  Today is my last day    OBJECTIVE    43 min Therapeutic Exercise:  [] See flow sheet :   Rationale: increase ROM, increase strength, improve coordination, improve balance and increase proprioception to improve the patients ability to ambulate community distances      With   [] TE   [] TA   [] neuro   [] other: Patient Education: [x] Review HEP    [x] Progressed/Changed HEP based on:   [] positioning   [] body mechanics   [] transfers   [] heat/ice application    [] other:      Other Objective/Functional Measures:      Pain Level (0-10 scale) post treatment: 0/10    ASSESSMENT/Changes in Function: Pt has met all goals and is independent with HEP/  Pt to be discharged today. Patient will continue to benefit from skilled PT services to address functional mobility deficits, address ROM deficits, address strength deficits, analyze and address soft tissue restrictions, analyze and cue movement patterns, analyze and modify body mechanics/ergonomics, assess and modify postural abnormalities, address imbalance/dizziness and instruct in home and community integration to attain remaining goals.      []  See Plan of Care  []  See progress note/recertification  []  See Discharge Summary         Progress towards goals / Updated goals:  Short Term Goals: To be accomplished in 3 weeks:  1. Patient will be compliant with HEP to progress toward goals and restore functional mobility. Eval Status: Issued at Eval  Current status: compliance MET 6-19-19     2. Patient will improve FOTO score by 13 points to improve functional tolerance for exercise. Eval Status: FOTO 32  Current: 58 MET 6-5-19     3. Patient will improve pain in right knee to 5/10 at worst to improve ambulation tolerance and restore prior level of function. Eval Status: 10/10 at worst  Current: 2-3/10 MET 6-19-19     Long Term Goals: To be accomplished in 6 weeks:  1. Patient will be independent with HEP to progress toward goals of community ambulation without pain. Eval Status: issued at Eval  Current: MET 6-19-19     2. Patient will improve FOTO score by 26 points to improve functional tolerance for community ambulation. Eval Status: FOTO 32  Current: 58 MET 6-5-19     3. Patient will have 5/5 right LE strength to return to goals of returning to work. Eval Status:   Hip L (1-5) R (1-5)   Hip Flexion 5/5 4/5*   Hip ABD 5/5 4/5   Hip ADD 5/5 4-/5   Hip ER 4+/5 3+/5   Hip IR 4+/5 3+/5      Knee L (1-5) R (1-5)   Knee Flexion 5/5 4/5*   Knee Extension 5/5 4/5*   Ankle PF 5/5 5/5   Ankle DF 5/5 5/5   Other          Current: right leg 5/5 MET  4. Patient will improve pain in right knee to 2/10 to improve ambulation tolerance and restore prior level of function. Eval Status: 10/10 at worst  Current: RESOLVED 6-5-19          PLAN  []  Upgrade activities as tolerated     []  Continue plan of care  []  Update interventions per flow sheet       [x]  Discharge due to:_goals met[]  Other:_      Elver Molina PTA 6/19/2019  2:57 PM    No future appointments.

## 2019-07-01 NOTE — PROGRESS NOTES
[facility]  [address], [address]  Ph [phone]  Fx [phone]    Physical Therapy Discharge Summary    Patient name: Ramon León Start of Care: 19   Referral source: Stephen Gutierrez MD : 1985   Medical/Treatment Diagnosis: Pain in right knee [M25.561] Onset Date:18     Prior Hospitalization: see medical history Provider#: [id number]   Medications: Verified on Patient Summary List    Comorbidities:  diabetes    Prior Level of Function:functionally independent, Urdu speaking    Visits from Start of Care: 11    Missed Visits: 0    Reporting Period : 19 to 19    Summary of Care:    Pt has made great progress since start of therapy and has met all goals. She is ready for DC to HEP at this time. Short Term Goals: To be accomplished in 3 weeks:  1. Patient will be compliant with HEP to progress toward goals and restore functional mobility. Eval Status: Issued at [facility]  Current status: compliance MET 19     2. Patient will improve FOTO score by 13 points to improve functional tolerance for exercise. Eval Status: FOTO 32  Current: 58 MET 19     3. Patient will improve pain in right knee to 5/10 at worst to improve ambulation tolerance and restore prior level of function. Eval Status: 10/10 at worst  Current: 2-3/10 MET 19     Long Term Goals: To be accomplished in 6 weeks:  1. Patient will be independent with HEP to progress toward goals of community ambulation without pain. Eval Status: issued at Eval  Current: MET 19     2. Patient will improve FOTO score by 26 points to improve functional tolerance for community ambulation. Eval Status: FOTO 32  Current: 58 MET 19     3. Patient will have 5/5 right LE strength to return to goals of returning to work.   Eval Status:   Hip L (1-5) R (1-5)   Hip Flexion 5/5 4/5*   Hip ABD 5/5 4/5   Hip ADD 5/5 4-/5   Hip ER 4+/5 3+/5   Hip IR 4+/5 3+/5      Knee L (1-5) R (1-5)   Knee Flexion 5/5 4/5*   Knee Extension 5/5 4/5*   Ankle PF 5/5 5/5   Ankle DF 5/5 5/5   Other          Current: right leg 5/5 MET  4. Patient will improve pain in right knee to 2/10 to improve ambulation tolerance and restore prior level of function.   Eval Status: 10/10 at worst  Current: RESOLVED 6-5-19              ASSESSMENT/RECOMMENDATIONS:  [x]Discontinue therapy: [x]Patient has reached or is progressing toward set goals      []Patient is non-compliant or has abdicated      []Due to lack of appreciable progress towards set goals    Andie Fernando, PT 7/1/2019 9:17 AM

## 2021-12-16 ENCOUNTER — TRANSCRIBE ORDER (OUTPATIENT)
Dept: SCHEDULING | Age: 36
End: 2021-12-16

## 2021-12-16 DIAGNOSIS — E01.0 THYROMEGALY: Primary | ICD-10-CM

## 2021-12-28 ENCOUNTER — HOSPITAL ENCOUNTER (OUTPATIENT)
Dept: ULTRASOUND IMAGING | Age: 36
Discharge: HOME | End: 2021-12-28
Attending: NURSE PRACTITIONER

## 2021-12-28 DIAGNOSIS — E01.0 THYROMEGALY: ICD-10-CM

## 2021-12-28 PROCEDURE — 76536 US EXAM OF HEAD AND NECK: CPT
